# Patient Record
(demographics unavailable — no encounter records)

---

## 2025-01-22 NOTE — REASON FOR VISIT
[Home] : at home, [unfilled] , at the time of the visit. [Other Location: e.g. Home (Enter Location, City,State)___] : at [unfilled] [Mother] : mother [Verbal consent obtained from patient] : the patient, [unfilled] [FreeTextEntry2] : ADHD, Inattentive type [FreeTextEntry4] : Concerta 54 mg q AM every day.  BCP Multivitamin [FreeTextEntry1] : Victoria and mother [FreeTextEntry3] : August 2024
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
## 2025-01-22 NOTE — SOCIAL HISTORY
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 (Doctorate in Health Sciences). Father: Dent/bumper repair Usman: 2 years older (ADHD; on QXR and TORO). [employer]. Home on most weekends. Dog: Bang (yellow lab; born 8/20); Demetra (chocolate lab; born 4/22)

## 2025-01-22 NOTE — BIRTH HISTORY
[At Term] : at term [Normal Vaginal Route] : by normal vaginal route [No complications] : there were no prenatal complications [None] : there were no delivery complications [FreeTextEntry1] : 8 pounds

## 2025-01-22 NOTE — PLAN
[FreeTextEntry3] : Continue on Concerta 54 mg in AM Answered questions from Victoria Counseled again about diversion. Follow-up: 4 months; sooner as needed. Telephone follow-up: as needed.

## 2025-05-19 NOTE — SOCIAL HISTORY
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 (Doctorate in Health Sciences). Father: Dent/bumper repair Usman: 2 years older (ADHD; on QXR and TORO). [employer]. Home on most weekends. Dog: Bang (yellow lab; born 8/20); Demetra (chocolate lab; born 4/22)

## 2025-05-19 NOTE — PLAN
[FreeTextEntry3] : Continue on Concerta 54 mg in AM Continue on Lexapro 10 mg for anxiety (per KINSEY Barajas) Answered questions from Victoria Follow-up: 4 - 6 months; sooner as needed. Telephone follow-up: as needed.

## 2025-05-19 NOTE — REASON FOR VISIT
[Home] : at home, [unfilled] , at the time of the visit. [Other Location: e.g. Home (Enter Location, City,State)___] : at [unfilled] [Mother] : mother [Verbal consent obtained from patient] : the patient, [unfilled] [FreeTextEntry2] : ADHD, Inattentive type [FreeTextEntry4] : Concerta 54 mg q AM every day Lexapro 10 mg. - for anxiety; on Lexapro since March 2025; on10 mg since early May 2025; followed by Nicole [place] (a PA))  BCP to regulate her menses; she anticipates this being changed. Multivitamin [FreeTextEntry1] : Victoria [FreeTextEntry3] : January 2025
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
